# Patient Record
Sex: FEMALE | Race: WHITE | Employment: FULL TIME | URBAN - METROPOLITAN AREA
[De-identification: names, ages, dates, MRNs, and addresses within clinical notes are randomized per-mention and may not be internally consistent; named-entity substitution may affect disease eponyms.]

---

## 2023-01-09 PROBLEM — M41.125 ADOLESCENT IDIOPATHIC SCOLIOSIS OF THORACOLUMBAR REGION: Status: ACTIVE | Noted: 2017-02-07

## 2023-10-23 ENCOUNTER — OFFICE VISIT (OUTPATIENT)
Dept: OBGYN CLINIC | Age: 23
End: 2023-10-23
Payer: COMMERCIAL

## 2023-10-23 VITALS
HEIGHT: 67 IN | DIASTOLIC BLOOD PRESSURE: 70 MMHG | BODY MASS INDEX: 20.09 KG/M2 | SYSTOLIC BLOOD PRESSURE: 124 MMHG | WEIGHT: 128 LBS

## 2023-10-23 DIAGNOSIS — E16.2 HYPOGLYCEMIA: Primary | ICD-10-CM

## 2023-10-23 LAB
ALBUMIN SERPL-MCNC: 4.2 G/DL (ref 3.5–5)
ALBUMIN/GLOB SERPL: 1.3 (ref 0.4–1.6)
ALP SERPL-CCNC: 57 U/L (ref 50–136)
ALT SERPL-CCNC: 27 U/L (ref 12–65)
ANION GAP SERPL CALC-SCNC: 4 MMOL/L (ref 2–11)
AST SERPL-CCNC: 13 U/L (ref 15–37)
BILIRUB SERPL-MCNC: 0.6 MG/DL (ref 0.2–1.1)
BUN SERPL-MCNC: 14 MG/DL (ref 6–23)
CALCIUM SERPL-MCNC: 9.5 MG/DL (ref 8.3–10.4)
CHLORIDE SERPL-SCNC: 109 MMOL/L (ref 101–110)
CHOLEST SERPL-MCNC: 168 MG/DL
CO2 SERPL-SCNC: 27 MMOL/L (ref 21–32)
CREAT SERPL-MCNC: 0.9 MG/DL (ref 0.6–1)
ERYTHROCYTE [DISTWIDTH] IN BLOOD BY AUTOMATED COUNT: 12.4 % (ref 11.9–14.6)
EST. AVERAGE GLUCOSE BLD GHB EST-MCNC: 88 MG/DL
GLOBULIN SER CALC-MCNC: 3.3 G/DL (ref 2.8–4.5)
GLUCOSE SERPL-MCNC: 84 MG/DL (ref 65–100)
HBA1C MFR BLD: 4.7 % (ref 4.8–5.6)
HCT VFR BLD AUTO: 44.5 % (ref 35.8–46.3)
HDLC SERPL-MCNC: 57 MG/DL (ref 40–60)
HDLC SERPL: 2.9
HGB BLD-MCNC: 14.8 G/DL (ref 11.7–15.4)
LDLC SERPL CALC-MCNC: 95.8 MG/DL
MCH RBC QN AUTO: 30 PG (ref 26.1–32.9)
MCHC RBC AUTO-ENTMCNC: 33.3 G/DL (ref 31.4–35)
MCV RBC AUTO: 90.1 FL (ref 82–102)
NRBC # BLD: 0 K/UL (ref 0–0.2)
PLATELET # BLD AUTO: 218 K/UL (ref 150–450)
PMV BLD AUTO: 10.7 FL (ref 9.4–12.3)
POTASSIUM SERPL-SCNC: 4.1 MMOL/L (ref 3.5–5.1)
PROT SERPL-MCNC: 7.5 G/DL (ref 6.3–8.2)
RBC # BLD AUTO: 4.94 M/UL (ref 4.05–5.2)
SODIUM SERPL-SCNC: 140 MMOL/L (ref 133–143)
T4 FREE SERPL-MCNC: 1.1 NG/DL (ref 0.78–1.46)
TRIGL SERPL-MCNC: 76 MG/DL (ref 35–150)
TSH W FREE THYROID IF ABNORMAL: 2.42 UIU/ML (ref 0.36–3.74)
VLDLC SERPL CALC-MCNC: 15.2 MG/DL (ref 6–23)
WBC # BLD AUTO: 6.3 K/UL (ref 4.3–11.1)

## 2023-10-23 PROCEDURE — 99214 OFFICE O/P EST MOD 30 MIN: CPT | Performed by: NURSE PRACTITIONER

## 2023-10-23 NOTE — PROGRESS NOTES
CC:   Chief Complaint   Patient presents with    Other     \"Sugar dropping\"       HPI:    Hardik Alvarado  is a 21 y.o. , No obstetric history on file., Patient's last menstrual period was 10/22/2023 (exact date). ,  who is seen for c/o blood sugar dropping. The patient informs me that she noticed her blood sugars would drop ~1 hour after eating full meals a month ago. She states \"when it first started it was not as frequent however, I have noticed it happens after every meal within the past week. \" States she will start experiencing nausea which then progresses into weakness and then she will start having \"shakiness to my hands. \" She informs me that she eats 3 meals daily and snacks in between and has not changed diet. Also informs me that she has been working out 4-5 days a week however, \"I have been doing that for awhile now. \" She also reports drinking a lot of water daily. She denies any family history of diabetes however, informs me that her mother was just diagnosed with Coos's disease and has had Graves disease in the past.     Contraception:  OCP's    She informs me that she has been experiencing 2 cycles monthly (one cycle during the 2nd week of active pills and a cycle during placebo week). Patient states cycles last 2-3 days and is a light flow. States wears regular tampon (changes every 6-8 hours), 3rd day experiences spotting however, continues to wear tampon during that day. Reports mild dysmenorrhea. Sexually active-male partner. No changes in partners. No concerns for STDs-declines STD testing today. Denies post coital VB or dyspareunia. GYN HISTORY:  As per HPI     Hx STDs: denies    Last Pap: 1/9/23-Neg cytology    PCP: Does not have one. Recommended she establish care with PCP.        Current Outpatient Medications on File Prior to Visit   Medication Sig Dispense Refill    levonorgestrel-ethinyl estradiol (SRONYX) 0.1-20 MG-MCG per tablet TAKE 1 TABLET BY MOUTH EVERY DAY 28 tablet 11     No

## 2023-12-27 ENCOUNTER — TELEPHONE (OUTPATIENT)
Dept: OBGYN CLINIC | Age: 23
End: 2023-12-27

## 2023-12-27 NOTE — TELEPHONE ENCOUNTER
Pt called stating that she has D/C her OCP. She will not need anymore refills at this time. Medication in chart changed to \"not taking\".

## 2024-01-21 NOTE — PROGRESS NOTES
headaches  Psych:  Denies new onset depression, anxiety, panic attacks  Heme:  Denies easy bruising, bleeding gums, frequent nosebleeds   GYN:  Denies bleeding or spotting between menses, heavy menses, menses longer than 7 days, pain with sex, severe menstrual cramps, bleeding after sex    Patient referred to a PCP for any significant nongyn findings on ROS.     /66   Ht 1.702 m (5' 7\")   Wt 56.2 kg (124 lb)   LMP 01/16/2024 (Exact Date)   BMI 19.42 kg/m²     Body mass index is 19.42 kg/m².    Wt Readings from Last 3 Encounters:   01/22/24 56.2 kg (124 lb)   10/23/23 58.1 kg (128 lb)   01/09/23 57.2 kg (126 lb)         Pt in no distress. Alert and oriented x3. Affect bright.  Well developed, well nourished.  HEENT: normocephalic, atraumatic. Sclerae nonicteric.  Neck supple w/o thyromegaly. Trachea midline.  Lungs:  Respiratory effort normal.   Breasts: no distinct masses or axillary lymphadenopathy. No skin or nipple changes.  Abdomen: soft and nontender, no masses, no organomegaly, no ventral hernia  Pelvic: normal external genitalia, urethral meatus, vagina and cervix. Bimanual exam w/o obvious masses or tenderness. Bladder nontender. Uterus normal size. Adnexae normal.  Perineum and anus normal. No hemorrhoids.         Lou was seen today for annual exam.    Diagnoses and all orders for this visit:    Well woman exam  -     PAP IG, Liquid-Based Rfx Aptima HPV when ASC-U, ASC-H, LSIL, HSIL, MARSHA; Future    Satisfactory cervical smear but lacking transformation zone  -     PAP IG, Liquid-Based Rfx Aptima HPV when ASC-U, ASC-H, LSIL, HSIL, MARSHA; Future    Screening for cervical cancer  -     PAP IG, Liquid-Based Rfx Aptima HPV when ASC-U, ASC-H, LSIL, HSIL, MARSHA; Future       Pt counseling:   Pt aware of the relative lower efficacy of condoms compared to other BC options. She can let me know if she wants rx for pills or other methods.  Rtn 1yr or prn    Jessica Wynn MD

## 2024-01-22 ENCOUNTER — OFFICE VISIT (OUTPATIENT)
Dept: OBGYN CLINIC | Age: 24
End: 2024-01-22
Payer: COMMERCIAL

## 2024-01-22 VITALS
WEIGHT: 124 LBS | DIASTOLIC BLOOD PRESSURE: 66 MMHG | HEIGHT: 67 IN | SYSTOLIC BLOOD PRESSURE: 122 MMHG | BODY MASS INDEX: 19.46 KG/M2

## 2024-01-22 DIAGNOSIS — Z12.4 SCREENING FOR CERVICAL CANCER: ICD-10-CM

## 2024-01-22 DIAGNOSIS — Z01.419 WELL WOMAN EXAM: Primary | ICD-10-CM

## 2024-01-22 DIAGNOSIS — R87.616 SATISFACTORY CERVICAL SMEAR BUT LACKING TRANSFORMATION ZONE: ICD-10-CM

## 2024-01-22 PROCEDURE — 99395 PREV VISIT EST AGE 18-39: CPT | Performed by: OBSTETRICS & GYNECOLOGY

## 2024-01-22 SDOH — ECONOMIC STABILITY: HOUSING INSECURITY
IN THE LAST 12 MONTHS, WAS THERE A TIME WHEN YOU DID NOT HAVE A STEADY PLACE TO SLEEP OR SLEPT IN A SHELTER (INCLUDING NOW)?: NO

## 2024-01-22 SDOH — ECONOMIC STABILITY: TRANSPORTATION INSECURITY
IN THE PAST 12 MONTHS, HAS LACK OF TRANSPORTATION KEPT YOU FROM MEETINGS, WORK, OR FROM GETTING THINGS NEEDED FOR DAILY LIVING?: NO

## 2024-01-22 SDOH — ECONOMIC STABILITY: FOOD INSECURITY: WITHIN THE PAST 12 MONTHS, YOU WORRIED THAT YOUR FOOD WOULD RUN OUT BEFORE YOU GOT MONEY TO BUY MORE.: NEVER TRUE

## 2024-01-22 SDOH — ECONOMIC STABILITY: FOOD INSECURITY: WITHIN THE PAST 12 MONTHS, THE FOOD YOU BOUGHT JUST DIDN'T LAST AND YOU DIDN'T HAVE MONEY TO GET MORE.: NEVER TRUE

## 2024-01-22 SDOH — ECONOMIC STABILITY: INCOME INSECURITY: HOW HARD IS IT FOR YOU TO PAY FOR THE VERY BASICS LIKE FOOD, HOUSING, MEDICAL CARE, AND HEATING?: NOT HARD AT ALL

## 2024-02-01 LAB
COLLECTION METHOD: ABNORMAL
CYTOLOGIST CVX/VAG CYTO: ABNORMAL
CYTOLOGY CVX/VAG DOC THIN PREP: ABNORMAL
DATE OF LMP: ABNORMAL
HPV REFLEX: ABNORMAL
Lab: ABNORMAL
PAP SOURCE: ABNORMAL
PATH REPORT.FINAL DX SPEC: ABNORMAL
PATHOLOGIST CVX/VAG CYTO: ABNORMAL
PATHOLOGIST PROVIDED ICD: ABNORMAL
PREV TREATMENT: ABNORMAL
STAT OF ADQ CVX/VAG CYTO-IMP: ABNORMAL

## 2024-02-02 LAB
HPV APTIMA: NEGATIVE
SPECIMEN SOURCE: NORMAL